# Patient Record
Sex: MALE | Race: OTHER
[De-identification: names, ages, dates, MRNs, and addresses within clinical notes are randomized per-mention and may not be internally consistent; named-entity substitution may affect disease eponyms.]

---

## 2017-10-26 PROBLEM — Z00.00 ENCOUNTER FOR PREVENTIVE HEALTH EXAMINATION: Status: ACTIVE | Noted: 2017-10-26

## 2017-11-03 ENCOUNTER — APPOINTMENT (OUTPATIENT)
Dept: HEART AND VASCULAR | Facility: CLINIC | Age: 72
End: 2017-11-03
Payer: COMMERCIAL

## 2017-11-03 DIAGNOSIS — Z86.39 PERSONAL HISTORY OF OTHER ENDOCRINE, NUTRITIONAL AND METABOLIC DISEASE: ICD-10-CM

## 2017-11-03 DIAGNOSIS — Z87.891 PERSONAL HISTORY OF NICOTINE DEPENDENCE: ICD-10-CM

## 2017-11-03 DIAGNOSIS — T82.330A LEAKAGE OF AORTIC (BIFURCATION) GRAFT (REPLACEMENT), INITIAL ENCOUNTER: ICD-10-CM

## 2017-11-03 DIAGNOSIS — I10 ESSENTIAL (PRIMARY) HYPERTENSION: ICD-10-CM

## 2017-11-03 DIAGNOSIS — Z86.79 PERSONAL HISTORY OF OTHER DISEASES OF THE CIRCULATORY SYSTEM: ICD-10-CM

## 2017-11-03 DIAGNOSIS — I25.10 ATHEROSCLEROTIC HEART DISEASE OF NATIVE CORONARY ARTERY W/OUT ANGINA PECTORIS: ICD-10-CM

## 2017-11-03 PROCEDURE — 99204 OFFICE O/P NEW MOD 45 MIN: CPT

## 2017-11-06 PROBLEM — T82.330A ENDOLEAK POST (EVAR) ENDOVASCULAR ANEURYSM REPAIR: Status: ACTIVE | Noted: 2017-11-06

## 2017-11-06 PROBLEM — Z86.79 HISTORY OF ABDOMINAL AORTIC ANEURYSM (AAA): Status: RESOLVED | Noted: 2017-11-06 | Resolved: 2017-11-06

## 2017-11-06 PROBLEM — I10 HTN (HYPERTENSION), BENIGN: Status: RESOLVED | Noted: 2017-11-06 | Resolved: 2017-11-06

## 2017-11-06 PROBLEM — Z87.891 FORMER SMOKER: Status: ACTIVE | Noted: 2017-11-06

## 2017-11-06 PROBLEM — Z86.39 HISTORY OF HYPERLIPIDEMIA: Status: RESOLVED | Noted: 2017-11-06 | Resolved: 2017-11-06

## 2017-11-06 PROBLEM — I25.10 CAD, MULTIPLE VESSEL: Status: RESOLVED | Noted: 2017-11-06 | Resolved: 2017-11-06

## 2017-11-06 PROBLEM — Z86.39 HISTORY OF DIABETES MELLITUS: Status: RESOLVED | Noted: 2017-11-06 | Resolved: 2017-11-06

## 2017-11-06 RX ORDER — METFORMIN HYDROCHLORIDE 625 MG/1
TABLET ORAL
Refills: 0 | Status: ACTIVE | COMMUNITY

## 2017-11-06 RX ORDER — GLYBURIDE 5 MG/1
5 TABLET ORAL
Refills: 0 | Status: ACTIVE | COMMUNITY

## 2017-11-06 RX ORDER — CLOPIDOGREL 75 MG/1
75 TABLET, FILM COATED ORAL
Refills: 0 | Status: ACTIVE | COMMUNITY

## 2017-11-06 RX ORDER — ATORVASTATIN CALCIUM 80 MG/1
80 TABLET, FILM COATED ORAL
Refills: 0 | Status: ACTIVE | COMMUNITY

## 2017-11-06 RX ORDER — METOPROLOL SUCCINATE 50 MG/1
50 TABLET, EXTENDED RELEASE ORAL
Refills: 0 | Status: ACTIVE | COMMUNITY

## 2017-11-06 RX ORDER — PIOGLITAZONE 45 MG/1
45 TABLET ORAL
Refills: 0 | Status: ACTIVE | COMMUNITY

## 2018-05-14 VITALS
DIASTOLIC BLOOD PRESSURE: 55 MMHG | OXYGEN SATURATION: 96 % | HEIGHT: 64 IN | HEART RATE: 86 BPM | RESPIRATION RATE: 18 BRPM | WEIGHT: 169.98 LBS | SYSTOLIC BLOOD PRESSURE: 153 MMHG

## 2018-05-14 RX ORDER — CHLORHEXIDINE GLUCONATE 213 G/1000ML
1 SOLUTION TOPICAL ONCE
Qty: 0 | Refills: 0 | Status: DISCONTINUED | OUTPATIENT
Start: 2018-05-15 | End: 2018-05-16

## 2018-05-14 NOTE — H&P ADULT - PSH
Endoleak post (EVAR) endovascular aneurysm repair    S/P CABG x 4    S/P TAVR (transcatheter aortic valve replacement)

## 2018-05-14 NOTE — H&P ADULT - HISTORY OF PRESENT ILLNESS
Patient is a 73 y/o male with PMHx of Patient is a 71 y/o male former smoker with PMHx of known CAD (s/p CABG 15 years ago), AAA (s/p endovascular repair), HTN, HLD, DM II, severe AS (s/p TAVR, 2015) who presented to Dr. Casas due to an endoleak that never got smaller. Patient is a 73 y/o male former smoker with PMHx of known CAD (s/p CABG 15 years ago), AAA (s/p endovascular repair), HTN, HLD, DM II, severe AS (s/p TAVR, 2015) who presented to Dr. Casas due to an endoleak that never got smaller. Patient is unclear if the aneurysm sac has been enlarging.     Pt denies abdominal pain, pulsatile mass, N/V, back/flank pain. Pt also denies CP, SOB, palpitations, dizziness, syncope, PND/orthopnea or LE edema.    In light of pts risk factors, pt now presents for endoleak repair under general anesthesia.     Called office for angiogram report (pending retrieval) Skeleton    Patient is a 71 y/o male former smoker with PMHx of known CAD (s/p CABG 15 years ago), AAA (s/p endovascular repair), HTN, HLD, DM II, severe AS (s/p TAVR, 2015) who presented to Dr. Casas due to an endoleak that never got smaller. Patient is unclear if the aneurysm sac has been enlarging.     Pt denies abdominal pain, pulsatile mass, N/V, back/flank pain. Pt also denies CP, SOB, palpitations, dizziness, syncope, PND/orthopnea or LE edema.    In light of pts risk factors, pt now presents for endoleak repair under general anesthesia.     Called office for angiogram report (pending retrieval) Patient is a 73 y/o male former smoker with PMHx of known CAD (s/p CABG 15 years ago), AAA (s/p endovascular repair), HTN, HLD, DM II, severe AS (s/p TAVR, 2015) who presented to Dr. Casas due to an endoleak that never got smaller. Patient is unclear if the aneurysm sac has been enlarging. Pt was seen by Dr. Casas about endoleak repair in 11/2017 but wanted to wait until after the holidays to proceed with repair. Pt denies abdominal pain, pulsatile mass, N/V, back/flank pain. Pt also denies CP, SOB, palpitations, dizziness, syncope, PND/orthopnea or LE edema. CTA abdomen/pelvis with and without contrast 7/6/17 showing endovascular stent graft in the thoracic aorta with known endoleak. Aneursym sac is enlarging in comparision to CTA1/3/2017.     In light of pts risk factors, enlarging aneurysm sac from CTA abdomen/pelvis pt now presents for endoleak repair under general anesthesia.     Called office for angiogram report (pending retrieval)

## 2018-05-14 NOTE — H&P ADULT - ASSESSMENT
73 y/o male former smoker with PMHx of known CAD (s/p CABG 15 years ago), AAA (s/p endovascular repair), HTN, HLD, DM II, severe AS (s/p TAVR, 2015) who presented to Dr. Casas due to an endoleak that never got smaller.  In light of pts risk factors, enlarging aneurysm sac from CTA abdomen/pelvis pt now presents for endoleak repair under general anesthesia.     Risks and benefits to be explained by Dr. Casas team.   Risks & benefits of procedure and alternative therapy have been explained to the patient including but not limited to: allergic reaction, bleeding w/possible need for blood transfusion, infection, renal and vascular compromise, limb damage, emergent surgery. Informed consent obtained and in chart.

## 2018-05-14 NOTE — H&P ADULT - ADDITIONAL PE
ASA: II  Mallampati: II  EKG: NSR @ 81 bpm, LBBB ASA: II  Mallampati: II  EKG: NSR @ 81 bpm, LBBB (unchanged from office EKG 5/1/18)

## 2018-05-15 ENCOUNTER — INPATIENT (INPATIENT)
Facility: HOSPITAL | Age: 73
LOS: 0 days | Discharge: HOME | DRG: 220 | End: 2018-05-16
Attending: RADIOLOGY | Admitting: RADIOLOGY
Payer: MEDICARE

## 2018-05-15 DIAGNOSIS — Z95.2 PRESENCE OF PROSTHETIC HEART VALVE: Chronic | ICD-10-CM

## 2018-05-15 DIAGNOSIS — T82.330A LEAKAGE OF AORTIC (BIFURCATION) GRAFT (REPLACEMENT), INITIAL ENCOUNTER: Chronic | ICD-10-CM

## 2018-05-15 DIAGNOSIS — Z95.1 PRESENCE OF AORTOCORONARY BYPASS GRAFT: Chronic | ICD-10-CM

## 2018-05-15 LAB
ALBUMIN SERPL ELPH-MCNC: 4.2 G/DL — SIGNIFICANT CHANGE UP (ref 3.3–5)
ALP SERPL-CCNC: 122 U/L — HIGH (ref 40–120)
ALT FLD-CCNC: 20 U/L — SIGNIFICANT CHANGE UP (ref 10–45)
ANION GAP SERPL CALC-SCNC: 9 MMOL/L — SIGNIFICANT CHANGE UP (ref 5–17)
APTT BLD: 33.7 SEC — SIGNIFICANT CHANGE UP (ref 27.5–37.4)
AST SERPL-CCNC: 17 U/L — SIGNIFICANT CHANGE UP (ref 10–40)
BILIRUB SERPL-MCNC: 0.4 MG/DL — SIGNIFICANT CHANGE UP (ref 0.2–1.2)
BUN SERPL-MCNC: 16 MG/DL — SIGNIFICANT CHANGE UP (ref 7–23)
CALCIUM SERPL-MCNC: 8.8 MG/DL — SIGNIFICANT CHANGE UP (ref 8.4–10.5)
CHLORIDE SERPL-SCNC: 104 MMOL/L — SIGNIFICANT CHANGE UP (ref 96–108)
CO2 SERPL-SCNC: 28 MMOL/L — SIGNIFICANT CHANGE UP (ref 22–31)
CREAT SERPL-MCNC: 0.79 MG/DL — SIGNIFICANT CHANGE UP (ref 0.5–1.3)
GLUCOSE BLDC GLUCOMTR-MCNC: 127 MG/DL — HIGH (ref 70–99)
GLUCOSE BLDC GLUCOMTR-MCNC: 151 MG/DL — HIGH (ref 70–99)
GLUCOSE BLDC GLUCOMTR-MCNC: 347 MG/DL — HIGH (ref 70–99)
GLUCOSE SERPL-MCNC: 112 MG/DL — HIGH (ref 70–99)
HCT VFR BLD CALC: 37.9 % — LOW (ref 39–50)
HGB BLD-MCNC: 12.2 G/DL — LOW (ref 13–17)
INR BLD: 0.98 — SIGNIFICANT CHANGE UP (ref 0.88–1.16)
MAGNESIUM SERPL-MCNC: 2 MG/DL — SIGNIFICANT CHANGE UP (ref 1.6–2.6)
MCHC RBC-ENTMCNC: 29.1 PG — SIGNIFICANT CHANGE UP (ref 27–34)
MCHC RBC-ENTMCNC: 32.2 G/DL — SIGNIFICANT CHANGE UP (ref 32–36)
MCV RBC AUTO: 90.5 FL — SIGNIFICANT CHANGE UP (ref 80–100)
PLATELET # BLD AUTO: 164 K/UL — SIGNIFICANT CHANGE UP (ref 150–400)
POTASSIUM SERPL-MCNC: 4.3 MMOL/L — SIGNIFICANT CHANGE UP (ref 3.5–5.3)
POTASSIUM SERPL-SCNC: 4.3 MMOL/L — SIGNIFICANT CHANGE UP (ref 3.5–5.3)
PROT SERPL-MCNC: 7.3 G/DL — SIGNIFICANT CHANGE UP (ref 6–8.3)
PROTHROM AB SERPL-ACNC: 10.9 SEC — SIGNIFICANT CHANGE UP (ref 9.8–12.7)
RBC # BLD: 4.19 M/UL — LOW (ref 4.2–5.8)
RBC # FLD: 14.2 % — SIGNIFICANT CHANGE UP (ref 10.3–16.9)
SODIUM SERPL-SCNC: 141 MMOL/L — SIGNIFICANT CHANGE UP (ref 135–145)
WBC # BLD: 7.4 K/UL — SIGNIFICANT CHANGE UP (ref 3.8–10.5)
WBC # FLD AUTO: 7.4 K/UL — SIGNIFICANT CHANGE UP (ref 3.8–10.5)

## 2018-05-15 PROCEDURE — 75625 CONTRAST EXAM ABDOMINL AORTA: CPT | Mod: 26,59

## 2018-05-15 PROCEDURE — 37244 VASC EMBOLIZE/OCCLUDE BLEED: CPT

## 2018-05-15 PROCEDURE — 93010 ELECTROCARDIOGRAM REPORT: CPT

## 2018-05-15 RX ORDER — METFORMIN HYDROCHLORIDE 850 MG/1
1 TABLET ORAL
Qty: 0 | Refills: 0 | COMMUNITY

## 2018-05-15 RX ORDER — ATORVASTATIN CALCIUM 80 MG/1
80 TABLET, FILM COATED ORAL AT BEDTIME
Qty: 0 | Refills: 0 | Status: DISCONTINUED | OUTPATIENT
Start: 2018-05-15 | End: 2018-05-16

## 2018-05-15 RX ORDER — PIOGLITAZONE HYDROCHLORIDE 15 MG/1
1 TABLET ORAL
Qty: 0 | Refills: 0 | COMMUNITY

## 2018-05-15 RX ORDER — SODIUM CHLORIDE 9 MG/ML
1000 INJECTION INTRAMUSCULAR; INTRAVENOUS; SUBCUTANEOUS
Qty: 0 | Refills: 0 | Status: DISCONTINUED | OUTPATIENT
Start: 2018-05-15 | End: 2018-05-16

## 2018-05-15 RX ORDER — MORPHINE SULFATE 50 MG/1
4 CAPSULE, EXTENDED RELEASE ORAL EVERY 4 HOURS
Qty: 0 | Refills: 0 | Status: DISCONTINUED | OUTPATIENT
Start: 2018-05-15 | End: 2018-05-16

## 2018-05-15 RX ORDER — GLUCAGON INJECTION, SOLUTION 0.5 MG/.1ML
1 INJECTION, SOLUTION SUBCUTANEOUS ONCE
Qty: 0 | Refills: 0 | Status: DISCONTINUED | OUTPATIENT
Start: 2018-05-15 | End: 2018-05-16

## 2018-05-15 RX ORDER — METOPROLOL TARTRATE 50 MG
25 TABLET ORAL ONCE
Qty: 0 | Refills: 0 | Status: COMPLETED | OUTPATIENT
Start: 2018-05-15 | End: 2018-05-15

## 2018-05-15 RX ORDER — DEXTROSE 50 % IN WATER 50 %
25 SYRINGE (ML) INTRAVENOUS ONCE
Qty: 0 | Refills: 0 | Status: DISCONTINUED | OUTPATIENT
Start: 2018-05-15 | End: 2018-05-16

## 2018-05-15 RX ORDER — ONDANSETRON 8 MG/1
4 TABLET, FILM COATED ORAL EVERY 4 HOURS
Qty: 0 | Refills: 0 | Status: DISCONTINUED | OUTPATIENT
Start: 2018-05-15 | End: 2018-05-16

## 2018-05-15 RX ORDER — GLYBURIDE 5 MG
1 TABLET ORAL
Qty: 0 | Refills: 0 | COMMUNITY

## 2018-05-15 RX ORDER — EXENATIDE 250 UG/ML
5 INJECTION SUBCUTANEOUS
Qty: 0 | Refills: 0 | COMMUNITY

## 2018-05-15 RX ORDER — CLOPIDOGREL BISULFATE 75 MG/1
75 TABLET, FILM COATED ORAL DAILY
Qty: 0 | Refills: 0 | Status: DISCONTINUED | OUTPATIENT
Start: 2018-05-16 | End: 2018-05-16

## 2018-05-15 RX ORDER — INSULIN LISPRO 100/ML
VIAL (ML) SUBCUTANEOUS
Qty: 0 | Refills: 0 | Status: DISCONTINUED | OUTPATIENT
Start: 2018-05-15 | End: 2018-05-16

## 2018-05-15 RX ORDER — SODIUM CHLORIDE 9 MG/ML
1000 INJECTION, SOLUTION INTRAVENOUS
Qty: 0 | Refills: 0 | Status: DISCONTINUED | OUTPATIENT
Start: 2018-05-15 | End: 2018-05-16

## 2018-05-15 RX ORDER — DEXTROSE 50 % IN WATER 50 %
12.5 SYRINGE (ML) INTRAVENOUS ONCE
Qty: 0 | Refills: 0 | Status: DISCONTINUED | OUTPATIENT
Start: 2018-05-15 | End: 2018-05-16

## 2018-05-15 RX ORDER — CEFAZOLIN SODIUM 1 G
1000 VIAL (EA) INJECTION EVERY 8 HOURS
Qty: 0 | Refills: 0 | Status: DISCONTINUED | OUTPATIENT
Start: 2018-05-15 | End: 2018-05-15

## 2018-05-15 RX ORDER — ATORVASTATIN CALCIUM 80 MG/1
1 TABLET, FILM COATED ORAL
Qty: 0 | Refills: 0 | COMMUNITY

## 2018-05-15 RX ORDER — CLOPIDOGREL BISULFATE 75 MG/1
1 TABLET, FILM COATED ORAL
Qty: 0 | Refills: 0 | COMMUNITY

## 2018-05-15 RX ORDER — DEXTROSE 50 % IN WATER 50 %
15 SYRINGE (ML) INTRAVENOUS ONCE
Qty: 0 | Refills: 0 | Status: DISCONTINUED | OUTPATIENT
Start: 2018-05-15 | End: 2018-05-16

## 2018-05-15 RX ORDER — METOPROLOL TARTRATE 50 MG
50 TABLET ORAL DAILY
Qty: 0 | Refills: 0 | Status: DISCONTINUED | OUTPATIENT
Start: 2018-05-15 | End: 2018-05-16

## 2018-05-15 RX ORDER — CEFAZOLIN SODIUM 1 G
1000 VIAL (EA) INJECTION EVERY 8 HOURS
Qty: 0 | Refills: 0 | Status: DISCONTINUED | OUTPATIENT
Start: 2018-05-15 | End: 2018-05-16

## 2018-05-15 RX ADMIN — Medication 1000 MILLIGRAM(S): at 21:37

## 2018-05-15 RX ADMIN — Medication 25 MILLIGRAM(S): at 22:45

## 2018-05-15 RX ADMIN — ATORVASTATIN CALCIUM 80 MILLIGRAM(S): 80 TABLET, FILM COATED ORAL at 21:37

## 2018-05-15 RX ADMIN — SODIUM CHLORIDE 75 MILLILITER(S): 9 INJECTION INTRAMUSCULAR; INTRAVENOUS; SUBCUTANEOUS at 14:49

## 2018-05-15 RX ADMIN — Medication 4: at 21:37

## 2018-05-15 RX ADMIN — Medication 1: at 16:37

## 2018-05-16 ENCOUNTER — TRANSCRIPTION ENCOUNTER (OUTPATIENT)
Age: 73
End: 2018-05-16

## 2018-05-16 VITALS
DIASTOLIC BLOOD PRESSURE: 89 MMHG | TEMPERATURE: 98 F | RESPIRATION RATE: 16 BRPM | HEART RATE: 90 BPM | SYSTOLIC BLOOD PRESSURE: 128 MMHG | OXYGEN SATURATION: 94 %

## 2018-05-16 LAB
ANION GAP SERPL CALC-SCNC: 16 MMOL/L — SIGNIFICANT CHANGE UP (ref 5–17)
BUN SERPL-MCNC: 30 MG/DL — HIGH (ref 7–23)
CALCIUM SERPL-MCNC: 8.4 MG/DL — SIGNIFICANT CHANGE UP (ref 8.4–10.5)
CHLORIDE SERPL-SCNC: 99 MMOL/L — SIGNIFICANT CHANGE UP (ref 96–108)
CO2 SERPL-SCNC: 23 MMOL/L — SIGNIFICANT CHANGE UP (ref 22–31)
CREAT SERPL-MCNC: 0.9 MG/DL — SIGNIFICANT CHANGE UP (ref 0.5–1.3)
GLUCOSE BLDC GLUCOMTR-MCNC: 278 MG/DL — HIGH (ref 70–99)
GLUCOSE BLDC GLUCOMTR-MCNC: 352 MG/DL — HIGH (ref 70–99)
GLUCOSE SERPL-MCNC: 305 MG/DL — HIGH (ref 70–99)
HBA1C BLD-MCNC: 6.8 % — HIGH (ref 4–5.6)
HCT VFR BLD CALC: 34.9 % — LOW (ref 39–50)
HGB BLD-MCNC: 11.1 G/DL — LOW (ref 13–17)
MCHC RBC-ENTMCNC: 28.5 PG — SIGNIFICANT CHANGE UP (ref 27–34)
MCHC RBC-ENTMCNC: 31.8 G/DL — LOW (ref 32–36)
MCV RBC AUTO: 89.7 FL — SIGNIFICANT CHANGE UP (ref 80–100)
PLATELET # BLD AUTO: 152 K/UL — SIGNIFICANT CHANGE UP (ref 150–400)
POTASSIUM SERPL-MCNC: 4.8 MMOL/L — SIGNIFICANT CHANGE UP (ref 3.5–5.3)
POTASSIUM SERPL-SCNC: 4.8 MMOL/L — SIGNIFICANT CHANGE UP (ref 3.5–5.3)
RBC # BLD: 3.89 M/UL — LOW (ref 4.2–5.8)
RBC # FLD: 14.1 % — SIGNIFICANT CHANGE UP (ref 10.3–16.9)
SODIUM SERPL-SCNC: 138 MMOL/L — SIGNIFICANT CHANGE UP (ref 135–145)
WBC # BLD: 9.9 K/UL — SIGNIFICANT CHANGE UP (ref 3.8–10.5)
WBC # FLD AUTO: 9.9 K/UL — SIGNIFICANT CHANGE UP (ref 3.8–10.5)

## 2018-05-16 PROCEDURE — 86901 BLOOD TYPING SEROLOGIC RH(D): CPT

## 2018-05-16 PROCEDURE — 85730 THROMBOPLASTIN TIME PARTIAL: CPT

## 2018-05-16 PROCEDURE — 82962 GLUCOSE BLOOD TEST: CPT

## 2018-05-16 PROCEDURE — 80048 BASIC METABOLIC PNL TOTAL CA: CPT

## 2018-05-16 PROCEDURE — 36415 COLL VENOUS BLD VENIPUNCTURE: CPT

## 2018-05-16 PROCEDURE — 93005 ELECTROCARDIOGRAM TRACING: CPT

## 2018-05-16 PROCEDURE — 86900 BLOOD TYPING SEROLOGIC ABO: CPT

## 2018-05-16 PROCEDURE — C1889: CPT

## 2018-05-16 PROCEDURE — C1887: CPT

## 2018-05-16 PROCEDURE — 85610 PROTHROMBIN TIME: CPT

## 2018-05-16 PROCEDURE — 80053 COMPREHEN METABOLIC PANEL: CPT

## 2018-05-16 PROCEDURE — 86850 RBC ANTIBODY SCREEN: CPT

## 2018-05-16 PROCEDURE — 83036 HEMOGLOBIN GLYCOSYLATED A1C: CPT

## 2018-05-16 PROCEDURE — C1894: CPT

## 2018-05-16 PROCEDURE — 83735 ASSAY OF MAGNESIUM: CPT

## 2018-05-16 PROCEDURE — 85027 COMPLETE CBC AUTOMATED: CPT

## 2018-05-16 PROCEDURE — C1769: CPT

## 2018-05-16 RX ORDER — METOPROLOL TARTRATE 50 MG
1 TABLET ORAL
Qty: 0 | Refills: 0 | COMMUNITY

## 2018-05-16 RX ORDER — METOPROLOL TARTRATE 50 MG
50 TABLET ORAL
Qty: 0 | Refills: 0 | Status: DISCONTINUED | OUTPATIENT
Start: 2018-05-16 | End: 2018-05-16

## 2018-05-16 RX ADMIN — Medication 3: at 07:28

## 2018-05-16 RX ADMIN — Medication 1000 MILLIGRAM(S): at 05:35

## 2018-05-16 RX ADMIN — Medication 50 MILLIGRAM(S): at 05:35

## 2018-05-16 RX ADMIN — CLOPIDOGREL BISULFATE 75 MILLIGRAM(S): 75 TABLET, FILM COATED ORAL at 10:50

## 2018-05-16 RX ADMIN — Medication 5: at 11:15

## 2018-05-16 NOTE — DISCHARGE NOTE ADULT - ADDITIONAL INSTRUCTIONS
please follow up with  in please follow up with Dr. Rice in 4-5 weeks. please follow up with Dr. Velasco in 4-5 weeks.

## 2018-05-16 NOTE — DISCHARGE NOTE ADULT - CARE PLAN
Principal Discharge DX:	Abdominal aortic aneurysm  Goal:	Please follow up with   Secondary Diagnosis:	Coronary artery disease  Goal:	please continue to take plavix 75 mg daily and metoprolol 50 mg two times daily.  Secondary Diagnosis:	Hypertension  Goal:	please continue to take metoprolol 50 mg two  Secondary Diagnosis:	Hyperlipidemia  Secondary Diagnosis:	Diabetes Principal Discharge DX:	Abdominal aortic aneurysm  Goal:	Please follow up with   Assessment and plan of treatment:	You had an endoleak thoracic repair on 5/15/18. You have been prescribed Keflex 500 mg every 8 hours for 7 days. Please take it and follow up with   Secondary Diagnosis:	Coronary artery disease  Goal:	please continue to take plavix 75 mg daily and metoprolol 50 mg two times daily.  Secondary Diagnosis:	Hypertension  Goal:	please continue to take metoprolol 50 mg two times daily.  Secondary Diagnosis:	Hyperlipidemia  Goal:	please continue to take atorvastatin 80 mg daily  Secondary Diagnosis:	Diabetes  Goal:	please continue to take home meds. Principal Discharge DX:	Abdominal aortic aneurysm  Goal:	Please follow up with   Assessment and plan of treatment:	You had an endoleak thoracic repair on 5/15/18. You have been prescribed Keflex 500 mg every 8 hours for 7 days. Please take it and follow up with   If you have any chest pain/abdominal pain/ back pain, pulsating mass in abdomen, Shortness of breath, please contact your cardiologist and go to the nearest emergency room.  Secondary Diagnosis:	Coronary artery disease  Goal:	please continue to take plavix 75 mg daily and metoprolol 50 mg two times daily.  Secondary Diagnosis:	Hypertension  Goal:	please continue to take metoprolol 50 mg two times daily.  Secondary Diagnosis:	Hyperlipidemia  Goal:	please continue to take atorvastatin 80 mg daily  Secondary Diagnosis:	Diabetes  Goal:	please continue to take home meds and follow up with your endocrinologist in 1-2 week. Principal Discharge DX:	Abdominal aortic aneurysm  Goal:	Please follow up with Dr. Rice in 4-5 weeks.  Assessment and plan of treatment:	You had an endoleak thoracic repair on 5/15/18. Please follow up with Dr. Rice in 4-5 weeks.   If you have any chest pain/abdominal pain/ back pain, pulsating mass in abdomen, Shortness of breath, please contact your cardiologist and go to the nearest emergency room.  Secondary Diagnosis:	Coronary artery disease  Goal:	please continue to take plavix 75 mg daily and metoprolol 50 mg two times daily.  Secondary Diagnosis:	Hypertension  Goal:	please continue to take metoprolol 50 mg two times daily.  Secondary Diagnosis:	Hyperlipidemia  Goal:	please continue to take lipitor 80 mg daily  Secondary Diagnosis:	Diabetes  Goal:	please continue to take home meds and follow up with your endocrinologist in 1-2 week.  Assessment and plan of treatment:	•	If you are a diabetic and you take Metformin: DO NOT TAKE your Metformin for two days. This medication can interact with the contrast used during your procedure therefore we want to ensure the contrast has left your body prior to you restarting your Metformin.   o	Make sure you monitor your finger sticks and diet while you are not taking your Metformin!

## 2018-05-16 NOTE — DISCHARGE NOTE ADULT - PROVIDER TOKENS
FREE:[LAST:[taryn],FIRST:[abigail],PHONE:[phone]],FAX:[(   )    -],ADDRESS:[[address] [place] [address]]]

## 2018-05-16 NOTE — DISCHARGE NOTE ADULT - PATIENT PORTAL LINK FT
You can access the [facility] Patient Portal, offered by [facility], by registering with the following website: [email or web address]

## 2018-05-16 NOTE — DISCHARGE NOTE ADULT - PLAN OF CARE
Please follow up with  please continue to take plavix 75 mg daily and metoprolol 50 mg two times daily. please continue to take metoprolol 50 mg two You had an endoleak thoracic repair on 5/15/18. You have been prescribed Keflex 500 mg every 8 hours for 7 days. Please take it and follow up with  please continue to take metoprolol 50 mg two times daily. please continue to take atorvastatin 80 mg daily please continue to take home meds. You had an endoleak thoracic repair on 5/15/18. You have been prescribed Keflex 500 mg every 8 hours for 7 days. Please take it and follow up with Dr.  If you have any chest pain/abdominal pain/ back pain, pulsating mass in abdomen, Shortness of breath, please contact your cardiologist and go to the nearest emergency room. please continue to take home meds and follow up with your endocrinologist in 1-2 week. Please follow up with Dr. Rice in 4-5 weeks. You had an endoleak thoracic repair on 5/15/18. Please follow up with Dr. Rice in 4-5 weeks.   If you have any chest pain/abdominal pain/ back pain, pulsating mass in abdomen, Shortness of breath, please contact your cardiologist and go to the nearest emergency room. please continue to take lipitor 80 mg daily •	If you are a diabetic and you take Metformin: DO NOT TAKE your Metformin for two days. This medication can interact with the contrast used during your procedure therefore we want to ensure the contrast has left your body prior to you restarting your Metformin.   o	Make sure you monitor your finger sticks and diet while you are not taking your Metformin!

## 2018-05-16 NOTE — DISCHARGE NOTE ADULT - HOSPITAL COURSE
.. 73 y/o male former smoker with PMHx of known CAD (s/p CABG 15 years ago), AAA (s/p endovascular repair), HTN, HLD, DM II, severe AS (s/p TAVR, 2015) who presented to Dr. Casas due to an endoleak that never got smaller. Patient is unclear if the aneurysm sac has been enlarging. Pt was seen by Dr. Casas about endoleak repair in 11/2017 but wanted to wait until after the holidays to proceed with repair. Pt denies abdominal pain, pulsatile mass, N/V, back/flank pain. Pt also denies CP, SOB, palpitations, dizziness, syncope, PND/orthopnea or LE edema. CTA abdomen/pelvis with and without contrast 7/6/17 showing endovascular stent graft in the thoracic aorta with known endoleak. Aneursym sac is enlarging in comparision to CTA1/3/2017. In light of pts risk factors, enlarging aneurysm sac from CTA abdomen/pelvis pt now presents for endoleak repair under general anesthesia. 5/15 s/p thoracic endoleak repair, 5F R CFA sheath out. Pt. admitted to [facility] o/n for monitoring. Pt. seen and examined at bedside today am. Pt. comfortable, denies any CP, SOB, dizziness, palpitations, R groin access site stable, no bleeding and hematoma noted, distal pulse intact. VSS. Labs stable o/n. Prescription for Keflex 500 mg q 8 hr X 7 days sent to pt. preferred pharmacy. Pt. stable to be d/c as per Dr. Casas …and to f/u with  … in 1-2 week   Pt. BG elevated; HgA1c 6.8; instructed to follow up with o/p endocrinologist in 1-2 week. 73 y/o male former smoker with PMHx of known CAD (s/p CABG 15 years ago), AAA (s/p endovascular repair), HTN, HLD, DM II, severe AS (s/p TAVR, 2015) who presented to Dr. Casas due to an endoleak that never got smaller. Patient is unclear if the aneurysm sac has been enlarging. Pt was seen by Dr. Casas about endoleak repair in 11/2017 but wanted to wait until after the holidays to proceed with repair. Pt denies abdominal pain, pulsatile mass, N/V, back/flank pain. Pt also denies CP, SOB, palpitations, dizziness, syncope, PND/orthopnea or LE edema. CTA abdomen/pelvis with and without contrast 7/6/17 showing endovascular stent graft in the thoracic aorta with known endoleak. Aneursym sac is enlarging in comparision to CTA1/3/2017. In light of pts risk factors, enlarging aneurysm sac from CTA abdomen/pelvis pt now presents for endoleak repair under general anesthesia. 5/15 s/p thoracic endoleak repair, 5F R CFA sheath out. Pt. admitted to [facility] o/n for monitoring. Pt. seen and examined at bedside today am. Pt. comfortable, denies any CP, SOB, dizziness, palpitations, R groin access site stable, no bleeding and hematoma noted, distal pulse intact. VSS. Labs stable o/n.  Pt. stable to be d/c as per Dr. Casas and to f/u with Dr. Rice in 4-5  week   Pt. BG elevated; HgA1c 6.8; instructed to follow up with o/p endocrinologist in 1-2 week. 71 y/o male former smoker with PMHx of known CAD (s/p CABG 15 years ago), AAA (s/p endovascular repair), HTN, HLD, DM II, severe AS (s/p TAVR, 2015) who presented to Dr. Casas due to an endoleak that never got smaller. Patient is unclear if the aneurysm sac has been enlarging. Pt was seen by Dr. Casas about endoleak repair in 11/2017 but wanted to wait until after the holidays to proceed with repair. Pt denies abdominal pain, pulsatile mass, N/V, back/flank pain. Pt also denies CP, SOB, palpitations, dizziness, syncope, PND/orthopnea or LE edema. CTA abdomen/pelvis with and without contrast 7/6/17 showing endovascular stent graft in the thoracic aorta with known endoleak. Aneursym sac is enlarging in comparision to CTA1/3/2017. In light of pts risk factors, enlarging aneurysm sac from CTA abdomen/pelvis pt now presents for endoleak repair under general anesthesia. 5/15 s/p thoracic endoleak repair, 5F R CFA sheath out. Pt. admitted to [facility] o/n for monitoring. Pt. seen and examined at bedside today am. Pt. comfortable, denies any CP, SOB, dizziness, palpitations, R groin access site stable, no bleeding and hematoma noted, distal pulse intact. VSS. Labs stable o/n.  Pt. stable to be d/c as per Dr. Casas and to f/u with Dr. Velasco in 4-5  week   Pt. BG elevated; HgA1c 6.8; instructed to follow up with o/p endocrinologist in 1-2 week.

## 2018-05-16 NOTE — DISCHARGE NOTE ADULT - MEDICATION SUMMARY - MEDICATIONS TO TAKE
I will START or STAY ON the medications listed below when I get home from the hospital:    Actos 45 mg oral tablet  -- 1 tab(s) by mouth once a day  -- Indication: For Diabetes    Byetta Prefilled Pen 5 mcg/0.02 mL subcutaneous solution  -- 5 unit(s) subcutaneous once a day  -- Indication: For Diabetes    glyBURIDE 5 mg oral tablet  -- 1 tab(s) by mouth once a day  -- Indication: For Diabetes    metFORMIN 1000 mg oral tablet  -- 1 tab(s) by mouth 2 times a day  -- Indication: For Diabetes. please continue taking it on 5/18/18. please watch your diet and check your fingerstick blood glucose whilw not on metformin.    Lipitor 80 mg oral tablet  -- 1 tab(s) by mouth once a day  -- Indication: For Cholesterol    Plavix 75 mg oral tablet  -- 1 tab(s) by mouth once a day  -- Indication: For Heart    Toprol-XL 50 mg oral tablet, extended release  -- 1 tab(s) by mouth 2 times a day  -- Indication: For blood pressure

## 2018-05-16 NOTE — DISCHARGE NOTE ADULT - INSTRUCTIONS
•	Your procedure was done through your groin.   •	You do not need to keep this area covered and you may shower  •	Please avoid any heavy lifting  (no more than 3 to 5 lbs) or strenuous activity for five days  •	If you develop any swelling, bleeding, hardening of the skin (hematoma formation), acute pain, numbness/tingling  in your leg please contact your doctor immediately or call our 24/7 line: [phone]

## 2018-05-24 DIAGNOSIS — Z88.6 ALLERGY STATUS TO ANALGESIC AGENT: ICD-10-CM

## 2018-05-24 DIAGNOSIS — E78.5 HYPERLIPIDEMIA, UNSPECIFIED: ICD-10-CM

## 2018-05-24 DIAGNOSIS — Z95.1 PRESENCE OF AORTOCORONARY BYPASS GRAFT: ICD-10-CM

## 2018-05-24 DIAGNOSIS — T82.330A LEAKAGE OF AORTIC (BIFURCATION) GRAFT (REPLACEMENT), INITIAL ENCOUNTER: ICD-10-CM

## 2018-05-24 DIAGNOSIS — Z95.2 PRESENCE OF PROSTHETIC HEART VALVE: ICD-10-CM

## 2018-05-24 DIAGNOSIS — I10 ESSENTIAL (PRIMARY) HYPERTENSION: ICD-10-CM

## 2018-05-24 DIAGNOSIS — I71.2 THORACIC AORTIC ANEURYSM, WITHOUT RUPTURE: ICD-10-CM

## 2018-05-24 DIAGNOSIS — Z79.02 LONG TERM (CURRENT) USE OF ANTITHROMBOTICS/ANTIPLATELETS: ICD-10-CM

## 2018-05-24 DIAGNOSIS — Z95.5 PRESENCE OF CORONARY ANGIOPLASTY IMPLANT AND GRAFT: ICD-10-CM

## 2018-05-24 DIAGNOSIS — Z87.891 PERSONAL HISTORY OF NICOTINE DEPENDENCE: ICD-10-CM

## 2018-05-24 DIAGNOSIS — I74.8 EMBOLISM AND THROMBOSIS OF OTHER ARTERIES: ICD-10-CM

## 2018-05-24 DIAGNOSIS — Z79.84 LONG TERM (CURRENT) USE OF ORAL HYPOGLYCEMIC DRUGS: ICD-10-CM

## 2018-05-24 DIAGNOSIS — E11.9 TYPE 2 DIABETES MELLITUS WITHOUT COMPLICATIONS: ICD-10-CM

## 2018-05-24 DIAGNOSIS — I25.10 ATHEROSCLEROTIC HEART DISEASE OF NATIVE CORONARY ARTERY WITHOUT ANGINA PECTORIS: ICD-10-CM

## 2020-01-17 NOTE — DISCHARGE NOTE ADULT - NS MD DC FALL RISK RISK
FAX RECEIVED - GIVEN TO NURSE   For information on Fall & Injury Prevention, visit [email or web address]

## 2020-07-17 NOTE — H&P ADULT - BP NONINVASIVE SYSTOLIC (MM HG)
153 Advancement Flap (Double) Text: The defect edges were debeveled with a #15 scalpel blade.  Given the location of the defect and the proximity to free margins a double advancement flap was deemed most appropriate.  Using a sterile surgical marker, the appropriate advancement flaps were drawn incorporating the defect and placing the expected incisions within the relaxed skin tension lines where possible.    The area thus outlined was incised deep to adipose tissue with a #15 scalpel blade.  The skin margins were undermined to an appropriate distance in all directions utilizing iris scissors.